# Patient Record
(demographics unavailable — no encounter records)

---

## 2025-05-28 NOTE — DISCUSSION/SUMMARY
[No Skin Concerns] : skin [Physical Growth and Development] : physical growth and development [Emotional Well-Being] : emotional well-being [No Medications] : ~He/She~ is not on any medications [Patient] : patient [Parent/Guardian] : Parent/Guardian [de-identified] : Plateau of height, and has dropped to the 4th percentile from the 17th percentile [de-identified] : Patient states he has sparse pubic hair which he describes as straight and not spreading to the thighs, he denies testicular enlargement [FreeTextEntry6] : Due for 2nd dose of Men ACWy and 2nd dose of HPV, mom requests to reschedule to receie vaccines at another time [FreeTextEntry1] : 16 year old male with a history of presenting for annual health care maintenance.  The patient did and family did not have any concerns today. CIR reviewed and patient is due for HPV and Meningitis.   Does have a dental home. No mental health concerns. HEADSS assessment demonstrated no health risks. Reviewed growth chart and demonstrated decreasing height percentile from 17th to 4th percentile. Vision screen within normal limits.  - Immunizations: Due for HPV and Meningitis; will return for vaccines  - Labs: No labs today   Growth concerns:  Unable to calculate mid parental height at this visit since mom was in a rush to go to work.  Patient endorses some hair growth in pubic area and some testicular growth.  If by next year patient's height does not show improvement/no advancement in Kevan staging will discuss further work up.   - Mental Health:  Conducted a standardized annual depression screening using an approved tool (e.g., PHQ-9) and spent at least 5 - 15 minutes reviewing the results with the patient.   I spent at least 15 minutes providing preventive counseling and/or risk factor reduction interventions, addressing issues such as family problems, nutrition and exercise, substance abuse, sexual practices, injury prevention, and other health-related behaviors.

## 2025-05-28 NOTE — DISCUSSION/SUMMARY
[No Skin Concerns] : skin [Physical Growth and Development] : physical growth and development [Emotional Well-Being] : emotional well-being [No Medications] : ~He/She~ is not on any medications [Patient] : patient [Parent/Guardian] : Parent/Guardian [de-identified] : Plateau of height, and has dropped to the 4th percentile from the 17th percentile [de-identified] : Patient states he has sparse pubic hair which he describes as straight and not spreading to the thighs, he denies testicular enlargement [FreeTextEntry6] : Due for 2nd dose of Men ACWy and 2nd dose of HPV, mom requests to reschedule to receie vaccines at another time [FreeTextEntry1] : 16 year old male with a history of presenting for annual health care maintenance.  The patient did and family did not have any concerns today. CIR reviewed and patient is due for HPV and Meningitis.   Does have a dental home. No mental health concerns. HEADSS assessment demonstrated no health risks. Reviewed growth chart and demonstrated decreasing height percentile from 17th to 4th percentile. Vision screen within normal limits.  - Immunizations: Due for HPV and Meningitis; will return for vaccines  - Labs: No labs today   Growth concerns:  Unable to calculate mid parental height at this visit since mom was in a rush to go to work.  Patient endorses some hair growth in pubic area and some testicular growth.  If by next year patient's height does not show improvement/no advancement in Kevan staging will discuss further work up.   - Mental Health:  Conducted a standardized annual depression screening using an approved tool (e.g., PHQ-9) and spent at least 5 - 15 minutes reviewing the results with the patient.   I spent at least 15 minutes providing preventive counseling and/or risk factor reduction interventions, addressing issues such as family problems, nutrition and exercise, substance abuse, sexual practices, injury prevention, and other health-related behaviors.

## 2025-05-28 NOTE — HISTORY OF PRESENT ILLNESS
[Mother] : mother [Yes] : Patient goes to dentist yearly [Up to date] : Up to date [Grade: ____] : Grade: [unfilled] [No] : Patient has not had sexual intercourse [With Teen] : teen [Has ways to cope with stress] : has ways to cope with stress [Displays self-confidence] : displays self-confidence [Sleep Concerns] : no sleep concerns [Uses electronic nicotine delivery system] : does not use electronic nicotine delivery system [Uses tobacco] : does not use tobacco [Uses drugs] : does not use drugs  [Drinks alcohol] : does not drink alcohol [Has problems with sleep] : does not have problems with sleep [Gets depressed, anxious, or irritable/has mood swings] : does not get depressed, anxious, or irritable/has mood swings [Has thought about hurting self or considered suicide] : has not thought about hurting self or considered suicide [FreeTextEntry7] : 16-year-old M presenting to the adolescent clinic for the first time Patient and mom deny PMH, PSH and any allergies Present for [facility] visit. PMH: none  PSH: none Allergies: none Vaccines up to date  [de-identified] : None [de-identified] : Due for HPV and Meningitis  [de-identified] : Feels safe at home [de-identified] : Plays videogames for fun [de-identified] : Identifies as he/him, attracted to females; denies abuse

## 2025-05-28 NOTE — HISTORY OF PRESENT ILLNESS
[Mother] : mother [Yes] : Patient goes to dentist yearly [Up to date] : Up to date [Grade: ____] : Grade: [unfilled] [No] : Patient has not had sexual intercourse [With Teen] : teen [Has ways to cope with stress] : has ways to cope with stress [Displays self-confidence] : displays self-confidence [Sleep Concerns] : no sleep concerns [Uses electronic nicotine delivery system] : does not use electronic nicotine delivery system [Uses tobacco] : does not use tobacco [Uses drugs] : does not use drugs  [Drinks alcohol] : does not drink alcohol [Has problems with sleep] : does not have problems with sleep [Gets depressed, anxious, or irritable/has mood swings] : does not get depressed, anxious, or irritable/has mood swings [Has thought about hurting self or considered suicide] : has not thought about hurting self or considered suicide [FreeTextEntry7] : 16-year-old M presenting to the adolescent clinic for the first time Patient and mom deny PMH, PSH and any allergies Present for [facility] visit. PMH: none  PSH: none Allergies: none Vaccines up to date  [de-identified] : None [de-identified] : Due for HPV and Meningitis  [de-identified] : Feels safe at home [de-identified] : Plays videogames for fun [de-identified] : Identifies as he/him, attracted to females; denies abuse

## 2025-07-29 NOTE — HISTORY OF PRESENT ILLNESS
[de-identified] : 16 y.o. male here for immunization update. Needs HPV #2 and Menactra # 2.  Given today without side effects.  Pt had concerns at last visit about development (mostly growth and pubertal development).  Expressed preferenc efor a male provider.  We spent 10 minutes or so discussing his concerns.  Pt refused genital exam at this time, has never been sexually active and denies nocturnal emmisions or masturbation.  Acknoeldges pubic hair present, axillay hair noted and (sparse) facial hair noted.  Mom;s height was not assessed and pt doesn't know his dad.  I expressed willingness to support pt and help answer future questions as they arose.  Mom and pt's height apprears grossly similar (kaushal).  Expllained that he may not have much heigth growth left.